# Patient Record
(demographics unavailable — no encounter records)

---

## 2024-10-23 NOTE — ASSESSMENT
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 Roddy Evans 6/2023, can f/u as needed per note but to follow with pulm/PCP Most recent CT chest 6/2023 showed trace L pleural effusion  Increased creat reversed when bosutinib was held Had Renal/Lasix NM study showing no obstruction; saw Dr. Hernandez 11/23 restarted Sprycel when BCR/ABL increased tolerating well  bcr/abl not detected (7/1/24)  CBC results reviewed and d/w pt  Hypothyroid: on synthroid, most recent TSH July 2024 mildly elevated  follow BCR-ABL PCR Check CMP, LDH, Phos, Mg, repeat BCR-ABL quant PCR  anemia - iron studies (7/1/24) - notable for 13% iron saturation; will repeat today with ferritin  RV 3 months or sooner if needed  Seen and d/w Dr. Henrry Chu MD Hematology/Oncology Fellow, PGY-5 10/21/2024

## 2024-10-23 NOTE — HISTORY OF PRESENT ILLNESS
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  Platelets were elevated at over 900,000.  She has been on dasatanib 100 mg daily since with good tolerance.  She has met all milestones and is in CHR and CMR.Her last marrow was in November of 2012 and was free of disease.  She has had an intermittent lymphocytosis noted which may be associated with therapy with dasatinib.  [de-identified] : CHR, CMR [FreeTextEntry1] : dasatinib [de-identified] : was on bosutinib 500 mg/d since 2/2022 bosutinib stopped after it was shown that incr creatinine normalized off drug has been BCR-ABL PCR neg NOW bcr-abl 0.072 today 11/23 0.024% started back on sprycel around 11/20/23 100mg once aday good tolerance  previously, dasatinib was stopped when she developed symptomatic pleural effusion at a time of persistent PCR (-) for BCR-ABL for 2+ yrs She had molecular relapse, leading to start of bosutinib 2/2022 transient development then resolution of GI c/o when she started bosulif.  pulm status stable to improved, with CT chest 6/5/23 only showing trace left pleural effusion. showed small left pleural effusion, decreased   and incr in small pericardial effusion  Repeat CT chest 3/9/23 no change in small pleural effusion. Mild nodular appearance of lung parenchyma noted as well as previously not described left hydronephrosis colonoscopy 2-3 yrs ago UGI endoscopy > 5 yrs ago  pleural effusion being watched for now  CT chest done 6/5/2023  trace left pleural effusion.  : Here for follow up. Has no complaints or concerns. Reports daily compliance with dasatinib, no missed doses. Denies fevers, chills, night sweats, lymphadenopathy, fatigue, easy bruising/bleeding, CP, SOB, cough, nausea/vomiting, diarrhea, constipation. Has good energy and appetite. Derm - had hair loss better now with treatment  anemia - denies increas brusie or bleeding no black or blood in stool will check iron studies  10/21/24: feeling fine. No new symptoms. Woke up today with some eye puffiness and redness at temples, thinks it may have been makeup removal wipes. Taking Sprycel 100mg daily, rarely misses any doses. She believes last colonoscopy ~2 yrs ago, no issues with stools.

## 2024-10-23 NOTE — REVIEW OF SYSTEMS
[Diarrhea: Grade 0] : Diarrhea: Grade 0 [Skin Rash] : skin rash [Fever] : no fever [Chills] : no chills [Night Sweats] : no night sweats [Fatigue] : no fatigue [Dysphagia] : no dysphagia [Odynophagia] : no odynophagia [Chest Pain] : no chest pain [Palpitations] : no palpitations [Shortness Of Breath] : no shortness of breath [Wheezing] : no wheezing [Cough] : no cough [Abdominal Pain] : no abdominal pain [Vomiting] : no vomiting [Constipation] : no constipation [Dysuria] : no dysuria [Joint Pain] : no joint pain [Muscle Pain] : no muscle pain [Dizziness] : no dizziness [Easy Bleeding] : no tendency for easy bleeding [Easy Bruising] : no tendency for easy bruising

## 2024-10-23 NOTE — PHYSICAL EXAM
[Obese] : obese [Normal] : affect appropriate [Fully active, able to carry on all pre-disease performance without restriction] : Status 0 - Fully active, able to carry on all pre-disease performance without restriction [de-identified] : systolic murmur best heard at LUSB [de-identified] : 1+ edema to knees [de-identified] : abdomen obese soft NTND

## 2024-10-23 NOTE — HISTORY OF PRESENT ILLNESS
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  Platelets were elevated at over 900,000.  She has been on dasatanib 100 mg daily since with good tolerance.  She has met all milestones and is in CHR and CMR.Her last marrow was in November of 2012 and was free of disease.  She has had an intermittent lymphocytosis noted which may be associated with therapy with dasatinib.  [de-identified] : CHR, CMR [FreeTextEntry1] : dasatinib [de-identified] : was on bosutinib 500 mg/d since 2/2022 bosutinib stopped after it was shown that incr creatinine normalized off drug has been BCR-ABL PCR neg NOW bcr-abl 0.072 today 11/23 0.024% started back on sprycel around 11/20/23 100mg once aday good tolerance  previously, dasatinib was stopped when she developed symptomatic pleural effusion at a time of persistent PCR (-) for BCR-ABL for 2+ yrs She had molecular relapse, leading to start of bosutinib 2/2022 transient development then resolution of GI c/o when she started bosulif.  pulm status stable to improved, with CT chest 6/5/23 only showing trace left pleural effusion. showed small left pleural effusion, decreased   and incr in small pericardial effusion  Repeat CT chest 3/9/23 no change in small pleural effusion. Mild nodular appearance of lung parenchyma noted as well as previously not described left hydronephrosis colonoscopy 2-3 yrs ago UGI endoscopy > 5 yrs ago  pleural effusion being watched for now  CT chest done 6/5/2023  trace left pleural effusion.  : Here for follow up. Has no complaints or concerns. Reports daily compliance with dasatinib, no missed doses. Denies fevers, chills, night sweats, lymphadenopathy, fatigue, easy bruising/bleeding, CP, SOB, cough, nausea/vomiting, diarrhea, constipation. Has good energy and appetite. Derm - had hair loss better now with treatment  anemia - denies increas brusie or bleeding no black or blood in stool will check iron studies  10/21/24: feeling fine. No new symptoms. Woke up today with some eye puffiness and redness at temples, thinks it may have been makeup removal wipes. Taking Sprycel 100mg daily, rarely misses any doses. She believes last colonoscopy ~2 yrs ago, no issues with stools.

## 2024-10-23 NOTE — PHYSICAL EXAM
[Obese] : obese [Normal] : affect appropriate [Fully active, able to carry on all pre-disease performance without restriction] : Status 0 - Fully active, able to carry on all pre-disease performance without restriction [de-identified] : systolic murmur best heard at LUSB [de-identified] : 1+ edema to knees [de-identified] : abdomen obese soft NTND

## 2024-10-23 NOTE — PHYSICAL EXAM
[Obese] : obese [Normal] : affect appropriate [Fully active, able to carry on all pre-disease performance without restriction] : Status 0 - Fully active, able to carry on all pre-disease performance without restriction [de-identified] : systolic murmur best heard at LUSB [de-identified] : 1+ edema to knees [de-identified] : abdomen obese soft NTND

## 2024-10-23 NOTE — END OF VISIT
[] : Fellow [FreeTextEntry3] : No evidence so far of recurrent pleural effusion. Renal function recently wnl. Elected to go back on dasatinib rather than rechallenge with bosutinib b/o concern re renal fxn.

## 2024-10-23 NOTE — HISTORY OF PRESENT ILLNESS
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  Platelets were elevated at over 900,000.  She has been on dasatanib 100 mg daily since with good tolerance.  She has met all milestones and is in CHR and CMR.Her last marrow was in November of 2012 and was free of disease.  She has had an intermittent lymphocytosis noted which may be associated with therapy with dasatinib.  [de-identified] : CHR, CMR [FreeTextEntry1] : dasatinib [de-identified] : was on bosutinib 500 mg/d since 2/2022 bosutinib stopped after it was shown that incr creatinine normalized off drug has been BCR-ABL PCR neg NOW bcr-abl 0.072 today 11/23 0.024% started back on sprycel around 11/20/23 100mg once aday good tolerance  previously, dasatinib was stopped when she developed symptomatic pleural effusion at a time of persistent PCR (-) for BCR-ABL for 2+ yrs She had molecular relapse, leading to start of bosutinib 2/2022 transient development then resolution of GI c/o when she started bosulif.  pulm status stable to improved, with CT chest 6/5/23 only showing trace left pleural effusion. showed small left pleural effusion, decreased   and incr in small pericardial effusion  Repeat CT chest 3/9/23 no change in small pleural effusion. Mild nodular appearance of lung parenchyma noted as well as previously not described left hydronephrosis colonoscopy 2-3 yrs ago UGI endoscopy > 5 yrs ago  pleural effusion being watched for now  CT chest done 6/5/2023  trace left pleural effusion.  : Here for follow up. Has no complaints or concerns. Reports daily compliance with dasatinib, no missed doses. Denies fevers, chills, night sweats, lymphadenopathy, fatigue, easy bruising/bleeding, CP, SOB, cough, nausea/vomiting, diarrhea, constipation. Has good energy and appetite. Derm - had hair loss better now with treatment  anemia - denies increas brusie or bleeding no black or blood in stool will check iron studies  10/21/24: feeling fine. No new symptoms. Woke up today with some eye puffiness and redness at temples, thinks it may have been makeup removal wipes. Taking Sprycel 100mg daily, rarely misses any doses. She believes last colonoscopy ~2 yrs ago, no issues with stools.

## 2025-01-22 NOTE — CONSULT LETTER
[FreeTextEntry3] : Waqas Sales M.D., [facility] [DrArnie  ___] : Dr. SANDOVAL

## 2025-01-22 NOTE — HISTORY OF PRESENT ILLNESS
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  Platelets were elevated at over 900,000.  She has been on dasatanib 100 mg daily since with good tolerance.  She has met all milestones and is in CHR and CMR.Her last marrow was in November of 2012 and was free of disease.  She has had an intermittent lymphocytosis noted which may be associated with therapy with dasatinib.  [de-identified] : CHR, CMR [FreeTextEntry1] : dasatinib [de-identified] : was on bosutinib 500 mg/d since 2/2022 bosutinib stopped after it was shown that incr creatinine normalized off drug rather than attempt a rechallenge, she elected to go back to dasatinib despite prior h/o having had pleural effusion due to that TKI. started back on sprycel around 11/20/23 100mg once/day with good tolerance  pulm remains improved, with CT chest 6/5/23 only showing trace left pleural effusion.

## 2025-01-22 NOTE — REVIEW OF SYSTEMS
[Diarrhea: Grade 0] : Diarrhea: Grade 0 [Skin Rash] : skin rash [Negative] : Allergic/Immunologic [Fever] : no fever [Chills] : no chills [Night Sweats] : no night sweats [Fatigue] : no fatigue [Dysphagia] : no dysphagia [Odynophagia] : no odynophagia [Chest Pain] : no chest pain [Palpitations] : no palpitations [Shortness Of Breath] : no shortness of breath [Wheezing] : no wheezing [Cough] : no cough [Abdominal Pain] : no abdominal pain [Vomiting] : no vomiting [Constipation] : no constipation [Dysuria] : no dysuria [Joint Pain] : no joint pain [Muscle Pain] : no muscle pain [Dizziness] : no dizziness [Easy Bleeding] : no tendency for easy bleeding [Easy Bruising] : no tendency for easy bruising

## 2025-01-22 NOTE — ASSESSMENT
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; has been mostly in  CMR now in CMR  post therapy interruptions and changes in TKI   Sprycel stopped 8/16/21 when she had HOLMAN and CXR showed moderate to large left pleural effusion Broke through with BCR-ABL + transcripts, confirmed few wks apart, and then started bosutinib 500 mg/d 2/15/22 and went back into complete molecular response as of 6/20/22 stopped bosulif due to likely TKI-related development of renal dysfunction; upon normalization of creat did not want to chance a rechallenge with bosulif and restarted sprycel, which she is tolerating well again w/o clinical evidence to date of recurrent pleural effusion. Has been in CMR.  CBC results reviewed and d/w pt WBC 9.65, Hgb 9.9, Plt 153K, MCV 94.5, diff nl except for incr abs mono count with nl mono %. Hgb has been steadiily falling again recommended f/u colonoscopy, endoscopy recheck ferritin today along with B12, retic, haptoglobin, immunoelectrophoresis transferrin saturation has been trending down  Hypothyroid: on synthroid, TSH variably high, synthroid dose being adjusted.    repeat follow BCR-ABL PCR Check CMP, LDH, Phos, Mg, TSH   RV 3 months

## 2025-01-22 NOTE — PHYSICAL EXAM
[Fully active, able to carry on all pre-disease performance without restriction] : Status 0 - Fully active, able to carry on all pre-disease performance without restriction [Obese] : obese [de-identified] : systolic murmur best heard at LUSB [de-identified] : abdomen obese soft NTND [Normal] : normal spine exam without palpable tenderness, no kyphosis or scoliosis [de-identified] : trace pedal edema

## 2025-01-22 NOTE — HISTORY OF PRESENT ILLNESS
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  Platelets were elevated at over 900,000.  She has been on dasatanib 100 mg daily since with good tolerance.  She has met all milestones and is in CHR and CMR.Her last marrow was in November of 2012 and was free of disease.  She has had an intermittent lymphocytosis noted which may be associated with therapy with dasatinib.  [de-identified] : CHR, CMR [FreeTextEntry1] : dasatinib [de-identified] : was on bosutinib 500 mg/d since 2/2022 bosutinib stopped after it was shown that incr creatinine normalized off drug rather than attempt a rechallenge, she elected to go back to dasatinib despite prior h/o having had pleural effusion due to that TKI. started back on sprycel around 11/20/23 100mg once/day with good tolerance  pulm remains improved, with CT chest 6/5/23 only showing trace left pleural effusion.

## 2025-01-22 NOTE — PHYSICAL EXAM
[Fully active, able to carry on all pre-disease performance without restriction] : Status 0 - Fully active, able to carry on all pre-disease performance without restriction [Obese] : obese [de-identified] : systolic murmur best heard at LUSB [de-identified] : abdomen obese soft NTND [Normal] : normal spine exam without palpable tenderness, no kyphosis or scoliosis [de-identified] : trace pedal edema

## 2025-04-26 NOTE — PHYSICAL EXAM
[Fully active, able to carry on all pre-disease performance without restriction] : Status 0 - Fully active, able to carry on all pre-disease performance without restriction [Obese] : obese [Normal] : affect appropriate [de-identified] : systolic ejection murmur RUSB [de-identified] : trace pedal edema [de-identified] : bilateral 1+ edema L>R

## 2025-04-26 NOTE — ASSESSMENT
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; has been mostly in  CMR now in CMR  post therapy interruptions and changes in TKI   Sprycel stopped 8/16/21 when she had HOLMAN and CXR showed moderate to large left pleural effusion Broke through with BCR-ABL + transcripts, confirmed few wks apart, and then started bosutinib 500 mg/d 2/15/22 and went back into complete molecular response as of 6/20/22 stopped bosulif due to likely TKI-related development of renal dysfunction; upon normalization of creat did not want to chance a rechallenge with bosulif and restarted sprycel, which she is tolerating well again w/o clinical evidence to date of recurrent pleural effusion.   CBC results reviewed and d/w pt WBC 8.64, Hgb 10.7, Plt 151K, MCV 96.8, diff nl except for incr abs mono count with nl mono %. Soluble transferrin receptor not elevated. B12 396. Ferritin 35 (Jan 2025), haptoglobin 155, iron 54, TIBC 287, 15% iron sat She is now taking PO iron and Hgb is normalizing. Recommended B12 1000 mcg daily for now. Mammogram (Jan 2025) BIRADS 1 and breast ultrasound (Jan 2025) normal, since mother had breast cancer MRI breast recommended [facility] Doppler LE normal (report not in chart, reported by patient) Colonoscopy/EGD/Capsule endoscopy normal (Jan 2025) (report not in chart, reported by patient) Repeat CXR today (order placed)  Hypothyroid: on synthroid, TSH variably high, synthroid dose being adjusted.    repeat and follow BCR-ABL PCR (last negative in Jan 2025) Check CMP, LDH, Phos, Mg, TSH   RV 3 months   Patient seen and d/w Dr. Sales  Instructed to contact our office with any new/worsening symptoms. Patient educated regarding plan of care, all questions/concerns addressed to the best of my abilities and patient's apparent satisfaction.   Lynn Fink MD PGY5 Hematology/Oncology Pager: [phone]

## 2025-04-26 NOTE — HISTORY OF PRESENT ILLNESS
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  Platelets were elevated at over 900,000.  She has been on dasatanib 100 mg daily since with good tolerance.  She has met all milestones and is in CHR and CMR.Her last marrow was in November of 2012 and was free of disease.  She has had an intermittent lymphocytosis noted which may be associated with therapy with dasatinib.  [de-identified] : CHR, CMR [FreeTextEntry1] : dasatinib [de-identified] : was on bosutinib 500 mg/d since 2/2022, in place of dasatinib post development of persistent pleural effusion bosutinib stopped after it was shown that incr creatinine normalized off drug rather than attempt a rechallenge, she elected to go back on dasatinib despite prior h/o having had pleural effusion due to that TKI. started back on sprycel around 11/20/23 100mg once/day with good tolerance  pulm remains improved, with CT chest 6/5/23 only showing trace left pleural effusion.     4/24/25: Colonoscopy/EGD/capsule normal in Feb 2025. She is taking iron supplements now. Levothyroxine changed to 150 mcg from 137 mcg several months ago. Appreciated a systolic murmur RUSB, which was not reported in the note from her cardiologist Dr. Ramos in 1/2022.  She denies SOB, fatigue. She endorses bilateral LE swelling and wears commpression stockings. The swelling in the left LE > RLE and she saw a vascular doctor who found negative workup for clots. Otherwise, she is tolerating dasatinib well.

## 2025-04-26 NOTE — HISTORY OF PRESENT ILLNESS
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  Platelets were elevated at over 900,000.  She has been on dasatanib 100 mg daily since with good tolerance.  She has met all milestones and is in CHR and CMR.Her last marrow was in November of 2012 and was free of disease.  She has had an intermittent lymphocytosis noted which may be associated with therapy with dasatinib.  [de-identified] : CHR, CMR [FreeTextEntry1] : dasatinib [de-identified] : was on bosutinib 500 mg/d since 2/2022, in place of dasatinib post development of persistent pleural effusion bosutinib stopped after it was shown that incr creatinine normalized off drug rather than attempt a rechallenge, she elected to go back on dasatinib despite prior h/o having had pleural effusion due to that TKI. started back on sprycel around 11/20/23 100mg once/day with good tolerance  pulm remains improved, with CT chest 6/5/23 only showing trace left pleural effusion.     4/24/25: Colonoscopy/EGD/capsule normal in Feb 2025. She is taking iron supplements now. Levothyroxine changed to 150 mcg from 137 mcg several months ago. Appreciated a systolic murmur RUSB, which was not reported in the note from her cardiologist Dr. Ramos in 1/2022.  She denies SOB, fatigue. She endorses bilateral LE swelling and wears commpression stockings. The swelling in the left LE > RLE and she saw a vascular doctor who found negative workup for clots. Otherwise, she is tolerating dasatinib well.

## 2025-04-26 NOTE — ASSESSMENT
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; has been mostly in  CMR now in CMR  post therapy interruptions and changes in TKI   Sprycel stopped 8/16/21 when she had HOLMAN and CXR showed moderate to large left pleural effusion Broke through with BCR-ABL + transcripts, confirmed few wks apart, and then started bosutinib 500 mg/d 2/15/22 and went back into complete molecular response as of 6/20/22 stopped bosulif due to likely TKI-related development of renal dysfunction; upon normalization of creat did not want to chance a rechallenge with bosulif and restarted sprycel, which she is tolerating well again w/o clinical evidence to date of recurrent pleural effusion.   CBC results reviewed and d/w pt WBC 8.64, Hgb 10.7, Plt 151K, MCV 96.8, diff nl except for incr abs mono count with nl mono %. Soluble transferrin receptor not elevated. B12 396. Ferritin 35 (Jan 2025), haptoglobin 155, iron 54, TIBC 287, 15% iron sat She is now taking PO iron and Hgb is normalizing. Recommended B12 1000 mcg daily for now. Mammogram (Jan 2025) BIRADS 1 and breast ultrasound (Jan 2025) normal, since mother had breast cancer MRI breast recommended [facility] Doppler LE normal (report not in chart, reported by patient) Colonoscopy/EGD/Capsule endoscopy normal (Jan 2025) (report not in chart, reported by patient) Repeat CXR today (order placed)  Hypothyroid: on synthroid, TSH variably high, synthroid dose being adjusted.    repeat and follow BCR-ABL PCR (last negative in Jan 2025) Check CMP, LDH, Phos, Mg, TSH   RV 3 months   Patient seen and d/w Dr. Sales  Instructed to contact our office with any new/worsening symptoms. Patient educated regarding plan of care, all questions/concerns addressed to the best of my abilities and patient's apparent satisfaction.   Lynn Fink MD PGY5 Hematology/Oncology Pager: [phone]

## 2025-04-26 NOTE — END OF VISIT
[] : Fellow [FreeTextEntry3] : remains w/o sxs of recurrent pleural effusion. Renal function now wnl. About two yrs since restarted dasatinib, had chest imaging. Will check CXR for monitoring for poss redevelopment of leural effusion.Anemia remains unexplained after neg GI w/u, lab work. Will continue Fe suppl., add B12 - level in low normal range - and continue to follow. Dasatinib can suppress Hgb even in patients with complete molecular response, though rarely.,

## 2025-04-26 NOTE — REVIEW OF SYSTEMS
[Diarrhea: Grade 0] : Diarrhea: Grade 0 [Skin Rash] : skin rash [Negative] : Endocrine [Fever] : no fever [Chills] : no chills [Night Sweats] : no night sweats [Fatigue] : no fatigue [Dysphagia] : no dysphagia [Odynophagia] : no odynophagia [Chest Pain] : no chest pain [Palpitations] : no palpitations [Shortness Of Breath] : no shortness of breath [Wheezing] : no wheezing [Cough] : no cough [Abdominal Pain] : no abdominal pain [Vomiting] : no vomiting [Constipation] : no constipation [Dysuria] : no dysuria [Joint Pain] : no joint pain [Muscle Pain] : no muscle pain [Dizziness] : no dizziness [Easy Bleeding] : no tendency for easy bleeding [Easy Bruising] : no tendency for easy bruising [FreeTextEntry9] : LE swelling L>R

## 2025-04-26 NOTE — PHYSICAL EXAM
[Fully active, able to carry on all pre-disease performance without restriction] : Status 0 - Fully active, able to carry on all pre-disease performance without restriction [Obese] : obese [Normal] : affect appropriate [de-identified] : systolic ejection murmur RUSB [de-identified] : trace pedal edema [de-identified] : bilateral 1+ edema L>R

## 2025-06-26 NOTE — CONSULT LETTER
[FreeTextEntry2] : Dr. Sanju Chase (Pulm/Ref) [FreeTextEntry3] : Roddy Evans MD \par  Attending Surgeon \par  Division of Thoracic Surgery \par  , Cardiovascular and Thoracic Surgery \par  [facility]\par  \par

## 2025-06-26 NOTE — CONSULT LETTER
[FreeTextEntry2] : Dr. Sanju Chase (Pulm/Ref) [FreeTextEntry3] : Roddy Evans MD \par  Attending Surgeon \par  Division of Thoracic Surgery \par  , Cardiovascular and Thoracic Surgery \par  [facility] [employer] [facility] at [facility]\par  \par

## 2025-06-26 NOTE — PHYSICAL EXAM
[Sclera] : the sclera and conjunctiva were normal [PERRL With Normal Accommodation] : pupils were equal in size, round, and reactive to light [Extraocular Movements] : extraocular movements were intact [Neck Appearance] : the appearance of the neck was normal [Neck Cervical Mass (___cm)] : no neck mass was observed [Jugular Venous Distention Increased] : there was no jugular-venous distention [Thyroid Diffuse Enlargement] : the thyroid was not enlarged [Thyroid Nodule] : there were no palpable thyroid nodules [] : no respiratory distress [Auscultation Breath Sounds / Voice Sounds] : lungs were clear to auscultation bilaterally [Heart Rate And Rhythm] : heart rate was normal and rhythm regular [Heart Sounds] : normal S1 and S2 [Heart Sounds Gallop] : no gallops [Murmurs] : no murmurs [Heart Sounds Pericardial Friction Rub] : no pericardial rub [Examination Of The Chest] : the chest was normal in appearance [Chest Visual Inspection Thoracic Asymmetry] : no chest asymmetry [Diminished Respiratory Excursion] : normal chest expansion [2+] : left 2+ [No CVA Tenderness] : no ~M costovertebral angle tenderness [No Spinal Tenderness] : no spinal tenderness [Right Carotid Bruit] : no bruit heard over the right carotid [Left Carotid Bruit] : no bruit heard over the left carotid [Right Femoral Bruit] : no bruit heard over the right femoral artery [Left Femoral Bruit] : no bruit heard over the left femoral artery

## 2025-06-26 NOTE — CONSULT LETTER
[FreeTextEntry2] : Dr. Sanju Chase (Pulm/Ref) [FreeTextEntry3] : Roddy Evans MD \par  Attending Surgeon \par  Division of Thoracic Surgery \par  , Cardiovascular and Thoracic Surgery \par  [facility] [employer] [facility] at [facility]\par  \par

## 2025-06-26 NOTE — ASSESSMENT
[FreeTextEntry1] : Ms. KERMIT BALLESTEROS, 65 year old female, w/ hx of chronic CML diagnosed in 06/2011 on Sprycel, HTN, HLD, hypothyroidism, who c/o HOLMAN s/p CXR on 08/16/2021 which revealed Moderate to large left pleural effusion, Sprycel was held since 8/16/21. CXR on 09/16/2021 revealed Moderate to large left pleural effusion with associated atelectasis. Patient was evaluated by Dr. Chase with elevated D-Dimer, a CT angio chest was ordered.   I have reviewed the patient's medical records and diagnostic images at time of this office consultation and have made the following recommendation: 1. Findings from MRI of breast and CXR today reviewed with patient, including trace to largely resolved pleural effusions. Discussed there is no treatment warranted at this time for pleural effusions. Given no intervention required, patient can return to office with any further thoracic surgery needs. Patient understands and agrees with plan. 2. CXR today with evidence of enlarged cardiac silhouette and vascular congestion. Instructed patient to follow up with Cardiologist with concern for CHF.    Recommendations reviewed with patient during this office visit, and all questions answered; Patient instructed on the importance of follow up and verbalizes understanding.     I, NEVAEH Chua, personally performed the evaluation and management (E/M) services for this established patient who presents today with (a) new problem(s)/exacerbation of (an) existing condition(s).  That E/M includes conducting the examination, assessing all new/exacerbated conditions, and establishing a new plan of care.  Today, my ACP, ERIN LaurentBC was here to observe my evaluation and management services for this new problem/exacerbated condition to be followed going forward.

## 2025-06-26 NOTE — HISTORY OF PRESENT ILLNESS
[FreeTextEntry1] : Ms. KERMIT BALLESTEROS, 67 year old female, w/ hx of chronic CML diagnosed in 06/2011 on Sprycel, HTN, HLD, hypothyroidism, who c/o HOLMAN s/p CXR on 08/16/2021 which revealed Moderate to large left pleural effusion, Sprycel was held since 8/16/21. CXR on 09/16/2021 revealed Moderate to large left pleural effusion with associated atelectasis. Patient was evaluated by Dr. Chase with elevated D-Dimer, a CT angio chest was ordered.   CT angio chest on 12/17/2021: - Moderate left pleural effusion - No pulmonary embolism  CT chest on 09/07/2022:  - Small left pleural effusion. It has decreased in size when compared to previous exam. - Small pericardial effusion. It has increased when compared to previous exam.   CT chest on 03/09/2023:  - Small left pleural effusion unchanged in size since 9/2022 and decreased since 12/2021. There is mild nodular appearance of the adjacent parenchyma.  - Unchanged small pericardial effusion. - Partially visualized left hydronephrosis, not completely evaluated on previous study.  Patient found to have elevated creatinine, CT chest with IV chest changed to CT chest w/o contrast.   CT chest on 06/05/2023:  - The lungs are clear. - There are no enlarged mediastinal, hilar or axillary lymph nodes. The visualized portion of the thyroid gland is unremarkable. - There is a trace left pleural effusion.  - There is no pneumothorax.  Patient was last seen on 06/14/2023, f/u as needed.   CXR on 05/21/2025: - No acute finding or change.  MR Breast w/wo IV Contrast on 6/10/25: - No MRI evidence of malignancy - Small left and trace right pleural effusion  CXR on 6/25/25: - Cardiac silhouette is enlarged - Visualized lungs show mildly increased markings - There is evidence of pulmonary vascular congestion with prominent edis - Findings are largely unchanged from May 2025 chest x-ray but significantly changed from prior in 2022 - Clinical correlation for congestive heart failure.  Patient is here today for a follow up, as she noted bilateral pleural effusions on MRI report of breast and wanted Dr. Evans's opinion. She denies any SOB, cough, wheeze, HOLMAN.

## 2025-06-26 NOTE — DATA REVIEWED
[FreeTextEntry1] : I have independently reviewed the following: MRI of Breast on 6/10/25 CXR on 6/25/25

## 2025-07-27 NOTE — HISTORY OF PRESENT ILLNESS
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  Platelets were elevated at over 900,000.  She has been on dasatanib 100 mg daily since with good tolerance.  She has met all milestones and is in CHR and CMR.Her last marrow was in November of 2012 and was free of disease.  She has had an intermittent lymphocytosis noted which may be associated with therapy with dasatinib.  [de-identified] : CHR, CMR [FreeTextEntry1] : dasatinib [de-identified] : was on bosutinib 500 mg/d since 2/2022 bosutinib stopped after it was shown that incr creatinine normalized off drug has been BCR-ABL PCR neg  11/23 0.024% started back on sprycel around 11/20/23 100mg once aday good tolerance  previously, dasatinib was stopped when she developed symptomatic pleural effusion at a time of persistent PCR (-) for BCR-ABL for 2+ yrs She had molecular relapse, leading to start of bosutinib 2/2022 transient development then resolution of GI c/o when she started bosulif.  pulm status stable to improved,  Repeat CT chest 3/9/23 no change in small pleural effusion. Mild nodular appearance of lung parenchyma noted as well as previously not described left hydronephrosis  CXR on 6/25/25: - Cardiac silhouette is enlarged - Visualized lungs show mildly increased markings - There is evidence of pulmonary vascular congestion with prominent edis - Findings are largely unchanged from May 2025 chest x-ray but significantly changed from prior in 2022 saw Dr Evans after MRI of breast showed small left and trace Right pleural effusion. no intervention needed CXR showed enlarged cardiac silhouette and vascular congestion to follow up with cardiology colonoscopy 2-3 yrs ago UGI endoscopy > 5 yrs ago.  : Here for follow up. Has no complaints or concerns. Reports daily compliance with dasatinib, no missed doses. Denies fevers, chills, night sweats, lymphadenopathy, fatigue, easy bruising/bleeding, CP, SOB, cough, nausea/vomiting, diarrhea, constipation. Has good energy and appetite. Derm - had hair loss better now with treatment  anemia - denies increase bruise or bleeding no black or blood in stool will check iron studies

## 2025-07-27 NOTE — HISTORY OF PRESENT ILLNESS
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  Platelets were elevated at over 900,000.  She has been on dasatanib 100 mg daily since with good tolerance.  She has met all milestones and is in CHR and CMR.Her last marrow was in November of 2012 and was free of disease.  She has had an intermittent lymphocytosis noted which may be associated with therapy with dasatinib.  [de-identified] : CHR, CMR [FreeTextEntry1] : dasatinib [de-identified] : was on bosutinib 500 mg/d since 2/2022 bosutinib stopped after it was shown that incr creatinine normalized off drug has been BCR-ABL PCR neg  11/23 0.024% started back on sprycel around 11/20/23 100mg once aday good tolerance  previously, dasatinib was stopped when she developed symptomatic pleural effusion at a time of persistent PCR (-) for BCR-ABL for 2+ yrs She had molecular relapse, leading to start of bosutinib 2/2022 transient development then resolution of GI c/o when she started bosulif.  pulm status stable to improved,  Repeat CT chest 3/9/23 no change in small pleural effusion. Mild nodular appearance of lung parenchyma noted as well as previously not described left hydronephrosis  CXR on 6/25/25: - Cardiac silhouette is enlarged - Visualized lungs show mildly increased markings - There is evidence of pulmonary vascular congestion with prominent edis - Findings are largely unchanged from May 2025 chest x-ray but significantly changed from prior in 2022 saw Dr Evans after MRI of breast showed small left and trace Right pleural effusion. no intervention needed CXR showed enlarged cardiac silhouette and vascular congestion to follow up with cardiology colonoscopy 2-3 yrs ago UGI endoscopy > 5 yrs ago.  : Here for follow up. Has no complaints or concerns. Reports daily compliance with dasatinib, no missed doses. Denies fevers, chills, night sweats, lymphadenopathy, fatigue, easy bruising/bleeding, CP, SOB, cough, nausea/vomiting, diarrhea, constipation. Has good energy and appetite. Derm - had hair loss better now with treatment  anemia - denies increase bruise or bleeding no black or blood in stool will check iron studies

## 2025-07-27 NOTE — ASSESSMENT
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   CBC results reviewed and d/w pt WBC 8.64, Hgb 10.7, Plt 151K, MCV 96.8, diff nl except for incr abs mono count with nl mono %. Soluble transferrin receptor not elevated. B12 396. Ferritin 35 (Jan 2025), haptoglobin 155, iron 54, TIBC 287, 15% iron sat She is now taking PO iron and Hgb is normalizing. Recommended B12 1000 mcg daily for now. Mammogram (Jan 2025) BIRADS 1 and breast ultrasound (Jan 2025) normal, since mother had breast cancer MRI breast recommended [facility] Doppler LE normal (report not in chart, reported by patient) Colonoscopy/EGD/Capsule endoscopy normal (Jan 2025) (report not in chart, reported by patient)   Hypothyroid: on synthroid, TSH variably high, synthroid dose being adjusted.    repeat and follow BCR-ABL PCR (last negative in Jan 2025) Check CMP, LDH, Phos, Mg, TSH   RV 3 months     Instructed to contact our office with any new/worsening symptoms. Patient educated regarding plan of care, all questions/concerns addressed to the best of my abilities and patient's apparent satisfaction.   Lynn Fink MD PGY5 Hematology/Oncology Pager: [phone]     [Palliative] : Goals of care discussed with patient: Palliative [Palliative Care Plan] : not applicable at this time

## 2025-07-27 NOTE — ASSESSMENT
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   CBC results reviewed and d/w pt WBC 8.64, Hgb 10.7, Plt 151K, MCV 96.8, diff nl except for incr abs mono count with nl mono %. Soluble transferrin receptor not elevated. B12 396. Ferritin 35 (Jan 2025), haptoglobin 155, iron 54, TIBC 287, 15% iron sat She is now taking PO iron and Hgb is normalizing. Recommended B12 1000 mcg daily for now. Mammogram (Jan 2025) BIRADS 1 and breast ultrasound (Jan 2025) normal, since mother had breast cancer MRI breast recommended [facility] Doppler LE normal (report not in chart, reported by patient) Colonoscopy/EGD/Capsule endoscopy normal (Jan 2025) (report not in chart, reported by patient)   Hypothyroid: on synthroid, TSH variably high, synthroid dose being adjusted.    repeat and follow BCR-ABL PCR (last negative in Jan 2025) Check CMP, LDH, Phos, Mg, TSH   RV 3 months     Instructed to contact our office with any new/worsening symptoms. Patient educated regarding plan of care, all questions/concerns addressed to the best of my abilities and patient's apparent satisfaction.   Lynn Fink MD PGY5 Hematology/Oncology Pager: [phone]     [Palliative] : Goals of care discussed with patient: Palliative [Palliative Care Plan] : not applicable at this time